# Patient Record
Sex: FEMALE | Race: WHITE | Employment: FULL TIME | URBAN - METROPOLITAN AREA
[De-identification: names, ages, dates, MRNs, and addresses within clinical notes are randomized per-mention and may not be internally consistent; named-entity substitution may affect disease eponyms.]

---

## 2021-04-04 ENCOUNTER — HOSPITAL ENCOUNTER (INPATIENT)
Age: 41
LOS: 3 days | Discharge: HOME HEALTH | DRG: 177 | End: 2021-04-08
Attending: FAMILY MEDICINE | Admitting: HOSPITALIST
Payer: COMMERCIAL

## 2021-04-04 ENCOUNTER — APPOINTMENT (OUTPATIENT)
Dept: CT IMAGING | Age: 41
DRG: 177 | End: 2021-04-04
Attending: FAMILY MEDICINE
Payer: COMMERCIAL

## 2021-04-04 ENCOUNTER — APPOINTMENT (OUTPATIENT)
Dept: GENERAL RADIOLOGY | Age: 41
DRG: 177 | End: 2021-04-04
Attending: FAMILY MEDICINE
Payer: COMMERCIAL

## 2021-04-04 DIAGNOSIS — J12.82 PNEUMONIA DUE TO COVID-19 VIRUS: ICD-10-CM

## 2021-04-04 DIAGNOSIS — U07.1 PNEUMONIA DUE TO COVID-19 VIRUS: ICD-10-CM

## 2021-04-04 DIAGNOSIS — R74.01 TRANSAMINITIS: ICD-10-CM

## 2021-04-04 DIAGNOSIS — J96.01 ACUTE RESPIRATORY FAILURE WITH HYPOXIA (HCC): Primary | ICD-10-CM

## 2021-04-04 PROBLEM — R09.02 HYPOXIA: Status: ACTIVE | Noted: 2021-04-04

## 2021-04-04 LAB
ALBUMIN SERPL-MCNC: 2.9 G/DL (ref 3.5–5)
ALBUMIN/GLOB SERPL: 0.6 {RATIO} (ref 1.1–2.2)
ALP SERPL-CCNC: 121 U/L (ref 45–117)
ALT SERPL-CCNC: 197 U/L (ref 12–78)
ANION GAP SERPL CALC-SCNC: 10 MMOL/L (ref 5–15)
APPEARANCE UR: CLEAR
AST SERPL W P-5'-P-CCNC: 151 U/L (ref 15–37)
BACTERIA URNS QL MICRO: NEGATIVE /HPF
BASOPHILS # BLD: 0 K/UL (ref 0–0.1)
BASOPHILS NFR BLD: 0 % (ref 0–1)
BILIRUB SERPL-MCNC: 1.1 MG/DL (ref 0.2–1)
BILIRUB UR QL: NEGATIVE
BUN SERPL-MCNC: 9 MG/DL (ref 6–20)
BUN/CREAT SERPL: 10 (ref 12–20)
CA-I BLD-MCNC: 8.3 MG/DL (ref 8.5–10.1)
CHLORIDE SERPL-SCNC: 96 MMOL/L (ref 97–108)
CO2 SERPL-SCNC: 28 MMOL/L (ref 21–32)
COLOR UR: YELLOW
CREAT SERPL-MCNC: 0.9 MG/DL (ref 0.55–1.02)
DIFFERENTIAL METHOD BLD: NORMAL
EOSINOPHIL # BLD: 0 K/UL (ref 0–0.4)
EOSINOPHIL NFR BLD: 0 % (ref 0–7)
EPITH CASTS URNS QL MICRO: ABNORMAL /LPF
ERYTHROCYTE [DISTWIDTH] IN BLOOD BY AUTOMATED COUNT: 13.3 % (ref 11.5–14.5)
GLOBULIN SER CALC-MCNC: 4.9 G/DL (ref 2–4)
GLUCOSE SERPL-MCNC: 111 MG/DL (ref 65–100)
GLUCOSE UR STRIP.AUTO-MCNC: NEGATIVE MG/DL
HCT VFR BLD AUTO: 41.3 % (ref 35–47)
HGB BLD-MCNC: 14 G/DL (ref 11.5–16)
HGB UR QL STRIP: ABNORMAL
IMM GRANULOCYTES # BLD AUTO: 0 K/UL (ref 0–0.04)
IMM GRANULOCYTES NFR BLD AUTO: 0 % (ref 0–0.5)
KETONES UR QL STRIP.AUTO: 15 MG/DL
LACTATE SERPL-SCNC: 1.2 MMOL/L (ref 0.4–2)
LEUKOCYTE ESTERASE UR QL STRIP.AUTO: NEGATIVE
LYMPHOCYTES # BLD: 0.9 K/UL (ref 0.8–3.5)
LYMPHOCYTES NFR BLD: 23 % (ref 12–49)
MCH RBC QN AUTO: 27.6 PG (ref 26–34)
MCHC RBC AUTO-ENTMCNC: 33.9 G/DL (ref 30–36.5)
MCV RBC AUTO: 81.5 FL (ref 80–99)
MONOCYTES # BLD: 0.3 K/UL (ref 0–1)
MONOCYTES NFR BLD: 7 % (ref 5–13)
NEUTS SEG # BLD: 2.8 K/UL (ref 1.8–8)
NEUTS SEG NFR BLD: 70 % (ref 32–75)
NITRITE UR QL STRIP.AUTO: NEGATIVE
PH UR STRIP: 6.5 [PH] (ref 5–8)
PLATELET # BLD AUTO: 270 K/UL (ref 150–400)
PMV BLD AUTO: 10 FL (ref 8.9–12.9)
POTASSIUM SERPL-SCNC: 3.4 MMOL/L (ref 3.5–5.1)
PROT SERPL-MCNC: 7.8 G/DL (ref 6.4–8.2)
PROT UR STRIP-MCNC: ABNORMAL MG/DL
RBC # BLD AUTO: 5.07 M/UL (ref 3.8–5.2)
RBC #/AREA URNS HPF: ABNORMAL /HPF (ref 0–5)
SODIUM SERPL-SCNC: 134 MMOL/L (ref 136–145)
SP GR UR REFRACTOMETRY: 1 (ref 1–1.03)
TROPONIN I SERPL-MCNC: <0.05 NG/ML
UROBILINOGEN UR QL STRIP.AUTO: 1 EU/DL (ref 0.2–1)
WBC # BLD AUTO: 4 K/UL (ref 3.6–11)
WBC URNS QL MICRO: ABNORMAL /HPF (ref 0–4)

## 2021-04-04 PROCEDURE — 71045 X-RAY EXAM CHEST 1 VIEW: CPT

## 2021-04-04 PROCEDURE — 74011000250 HC RX REV CODE- 250: Performed by: FAMILY MEDICINE

## 2021-04-04 PROCEDURE — 99284 EMERGENCY DEPT VISIT MOD MDM: CPT

## 2021-04-04 PROCEDURE — 96375 TX/PRO/DX INJ NEW DRUG ADDON: CPT

## 2021-04-04 PROCEDURE — 74011000636 HC RX REV CODE- 636: Performed by: FAMILY MEDICINE

## 2021-04-04 PROCEDURE — 71275 CT ANGIOGRAPHY CHEST: CPT

## 2021-04-04 PROCEDURE — 84484 ASSAY OF TROPONIN QUANT: CPT

## 2021-04-04 PROCEDURE — 3E0333Z INTRODUCTION OF ANTI-INFLAMMATORY INTO PERIPHERAL VEIN, PERCUTANEOUS APPROACH: ICD-10-PCS | Performed by: FAMILY MEDICINE

## 2021-04-04 PROCEDURE — 36415 COLL VENOUS BLD VENIPUNCTURE: CPT

## 2021-04-04 PROCEDURE — 74011250637 HC RX REV CODE- 250/637: Performed by: FAMILY MEDICINE

## 2021-04-04 PROCEDURE — 81001 URINALYSIS AUTO W/SCOPE: CPT

## 2021-04-04 PROCEDURE — 96374 THER/PROPH/DIAG INJ IV PUSH: CPT

## 2021-04-04 PROCEDURE — 83605 ASSAY OF LACTIC ACID: CPT

## 2021-04-04 PROCEDURE — 93005 ELECTROCARDIOGRAM TRACING: CPT

## 2021-04-04 PROCEDURE — 85025 COMPLETE CBC W/AUTO DIFF WBC: CPT

## 2021-04-04 PROCEDURE — 96361 HYDRATE IV INFUSION ADD-ON: CPT

## 2021-04-04 PROCEDURE — 80053 COMPREHEN METABOLIC PANEL: CPT

## 2021-04-04 PROCEDURE — 74011250636 HC RX REV CODE- 250/636: Performed by: FAMILY MEDICINE

## 2021-04-04 RX ORDER — DEXAMETHASONE SODIUM PHOSPHATE 10 MG/ML
6 INJECTION INTRAMUSCULAR; INTRAVENOUS ONCE
Status: COMPLETED | OUTPATIENT
Start: 2021-04-04 | End: 2021-04-04

## 2021-04-04 RX ORDER — ACETAMINOPHEN 500 MG
1000 TABLET ORAL ONCE
Status: COMPLETED | OUTPATIENT
Start: 2021-04-04 | End: 2021-04-04

## 2021-04-04 RX ADMIN — CEFTRIAXONE SODIUM 2 G: 2 INJECTION, POWDER, FOR SOLUTION INTRAMUSCULAR; INTRAVENOUS at 22:32

## 2021-04-04 RX ADMIN — IOPAMIDOL 100 ML: 755 INJECTION, SOLUTION INTRAVENOUS at 18:19

## 2021-04-04 RX ADMIN — SODIUM CHLORIDE 1000 ML: 9 INJECTION, SOLUTION INTRAVENOUS at 17:12

## 2021-04-04 RX ADMIN — ACETAMINOPHEN 1000 MG: 500 TABLET, FILM COATED ORAL at 17:35

## 2021-04-04 RX ADMIN — DEXAMETHASONE SODIUM PHOSPHATE 6 MG: 10 INJECTION, SOLUTION INTRAMUSCULAR; INTRAVENOUS at 18:34

## 2021-04-04 RX ADMIN — SODIUM CHLORIDE 1000 ML: 9 INJECTION, SOLUTION INTRAVENOUS at 16:12

## 2021-04-04 RX ADMIN — AZITHROMYCIN DIHYDRATE 500 MG: 500 INJECTION, POWDER, LYOPHILIZED, FOR SOLUTION INTRAVENOUS at 16:19

## 2021-04-04 NOTE — ED PROVIDER NOTES
EMERGENCY DEPARTMENT HISTORY AND PHYSICAL EXAM      Date: 4/4/2021  Patient Name: Kamran Black    History of Presenting Illness     Chief Complaint   Patient presents with    Fatigue    Shortness of Breath    Positive For Covid-19       History Provided By:     HPI: Kamran Black, is an extremely pleasant 46 y.o. female presenting to the ED with a chief complaint of cough, shortness of breath and fatigue. Her symptoms started 6 days ago and she was diagnosed with COVID-19 at that time. Since being diagnosed with COVID-19 she states she has become progressively more fatigued. She states she has not been eating or drinking anything. She endorses shortness of breath and a coarse cough that is getting worse. She endorses fevers, chills denies rigors. She denies nausea vomiting or diarrhea. She denies urinary symptoms. She denies any calf pain. She denies pleuritic chest pain. There are no other complaints, changes, or physical findings at this time. PCP: None    No current facility-administered medications on file prior to encounter. No current outpatient medications on file prior to encounter. Past History     Past Medical History:  No past medical history on file. Past Surgical History:  No past surgical history on file. Family History:  No family history on file. Social History:  Social History     Tobacco Use    Smoking status: Not on file   Substance Use Topics    Alcohol use: Not on file    Drug use: Not on file       Allergies:  No Known Allergies      Review of Systems     Review of Systems   Constitutional: Positive for activity change and appetite change. Negative for chills, fatigue and fever. HENT: Negative for congestion and sore throat. Eyes: Negative for photophobia and visual disturbance. Respiratory: Positive for cough and shortness of breath. Negative for wheezing. Cardiovascular: Negative for chest pain, palpitations and leg swelling.    Gastrointestinal: Negative for abdominal pain, diarrhea, nausea and vomiting. Endocrine: Negative for cold intolerance and heat intolerance. Musculoskeletal: Negative for gait problem and joint swelling. Skin: Negative for color change and rash. Neurological: Negative for dizziness and headaches. Physical Exam     Physical Exam  Constitutional:       General: She is not in acute distress. Appearance: She is well-developed. She is ill-appearing. She is not toxic-appearing. HENT:      Head: Normocephalic and atraumatic. Mouth/Throat:      Mouth: Mucous membranes are moist.      Pharynx: Oropharynx is clear. Eyes:      Conjunctiva/sclera: Conjunctivae normal.      Pupils: Pupils are equal, round, and reactive to light. Neck:      Musculoskeletal: Normal range of motion and neck supple. Cardiovascular:      Rate and Rhythm: Normal rate and regular rhythm. Heart sounds: No murmur. Pulmonary:      Effort: No respiratory distress. Breath sounds: No stridor. No wheezing or rhonchi. Comments: Occasional right-sided crackles, no increased work of breathing, no respiratory distress  Abdominal:      General: There is no distension. Tenderness: There is no abdominal tenderness. There is no rebound. Skin:     General: Skin is warm and dry. Neurological:      General: No focal deficit present. Mental Status: She is alert and oriented to person, place, and time.    Psychiatric:         Mood and Affect: Mood normal.         Behavior: Behavior normal.         Lab and Diagnostic Study Results     Labs -     Recent Results (from the past 12 hour(s))   CBC WITH AUTOMATED DIFF    Collection Time: 04/04/21  3:49 PM   Result Value Ref Range    WBC 4.0 3.6 - 11.0 K/uL    RBC 5.07 3.80 - 5.20 M/uL    HGB 14.0 11.5 - 16.0 g/dL    HCT 41.3 35.0 - 47.0 %    MCV 81.5 80.0 - 99.0 FL    MCH 27.6 26.0 - 34.0 PG    MCHC 33.9 30.0 - 36.5 g/dL    RDW 13.3 11.5 - 14.5 %    PLATELET 726 487 - 296 K/uL    MPV 10.0 8.9 - 12.9 FL    NEUTROPHILS 70 32 - 75 %    LYMPHOCYTES 23 12 - 49 %    MONOCYTES 7 5 - 13 %    EOSINOPHILS 0 0 - 7 %    BASOPHILS 0 0 - 1 %    IMMATURE GRANULOCYTES 0 0.0 - 0.5 %    ABS. NEUTROPHILS 2.8 1.8 - 8.0 K/UL    ABS. LYMPHOCYTES 0.9 0.8 - 3.5 K/UL    ABS. MONOCYTES 0.3 0.0 - 1.0 K/UL    ABS. EOSINOPHILS 0.0 0.0 - 0.4 K/UL    ABS. BASOPHILS 0.0 0.0 - 0.1 K/UL    ABS. IMM. GRANS. 0.0 0.00 - 0.04 K/UL    DF AUTOMATED     METABOLIC PANEL, COMPREHENSIVE    Collection Time: 04/04/21  3:49 PM   Result Value Ref Range    Sodium 134 (L) 136 - 145 mmol/L    Potassium 3.4 (L) 3.5 - 5.1 mmol/L    Chloride 96 (L) 97 - 108 mmol/L    CO2 28 21 - 32 mmol/L    Anion gap 10 5 - 15 mmol/L    Glucose 111 (H) 65 - 100 mg/dL    BUN 9 6 - 20 mg/dL    Creatinine 0.90 0.55 - 1.02 mg/dL    BUN/Creatinine ratio 10 (L) 12 - 20      GFR est AA >60 >60 ml/min/1.73m2    GFR est non-AA >60 >60 ml/min/1.73m2    Calcium 8.3 (L) 8.5 - 10.1 mg/dL    Bilirubin, total 1.1 (H) 0.2 - 1.0 mg/dL    AST (SGOT) 151 (H) 15 - 37 U/L    ALT (SGPT) 197 (H) 12 - 78 U/L    Alk.  phosphatase 121 (H) 45 - 117 U/L    Protein, total 7.8 6.4 - 8.2 g/dL    Albumin 2.9 (L) 3.5 - 5.0 g/dL    Globulin 4.9 (H) 2.0 - 4.0 g/dL    A-G Ratio 0.6 (L) 1.1 - 2.2     TROPONIN I    Collection Time: 04/04/21  3:49 PM   Result Value Ref Range    Troponin-I, Qt. <0.05 <0.05 ng/mL   LACTIC ACID    Collection Time: 04/04/21  3:49 PM   Result Value Ref Range    Lactic acid 1.2 0.4 - 2.0 mmol/L   URINALYSIS W/ RFLX MICROSCOPIC    Collection Time: 04/04/21  5:50 PM   Result Value Ref Range    Color Yellow      Appearance Clear Clear      Specific gravity 1.005 1.003 - 1.030      pH (UA) 6.5 5.0 - 8.0      Protein Trace (A) Negative mg/dL    Glucose Negative Negative mg/dL    Ketone 15 (A) Negative mg/dL    Bilirubin Negative Negative      Blood Large (A) Negative      Urobilinogen 1.0 0.2 - 1.0 EU/dL    Nitrites Negative Negative      Leukocyte Esterase Negative Negative URINE MICROSCOPIC    Collection Time: 04/04/21  5:50 PM   Result Value Ref Range    WBC 5-10 0 - 4 /hpf    RBC  0 - 5 /hpf    Epithelial cells Many (A) Few /lpf    Bacteria Negative Negative /hpf       Radiologic Studies -   @[email or web address]@  CT Results  (Last 48 hours)               04/04/21 1824  CTA CHEST W OR W WO CONT Final result    Impression:  Extensive patchy pneumonitis and pneumonia throughout each lung. No   evidence of PE       Narrative:  CT dose reduction was achieved through use of a standardized protocol tailored   for this examination and automatic exposure control for dose modulation. Contrast study maximum intensity projections       Patchy low-density groundglass opacification throughout the upper lobes, with   increasing density and multiple small patches of dense consolidation and   bandlike atelectasis in the lower portions of each lung. All segments affected. Central airways are open. Pulmonary arteries are moderately well opacified and show no filling defect or   distortion. Aorta shows normal dimensions, without dissection. Several   borderline-sized middle mediastinal lymph and right hilar nodes. No cardiac   finding. No pleural pericardial effusion. No significant upper abdominal or   chest wall finding               CXR Results  (Last 48 hours)               04/04/21 1605  XR CHEST PORT Final result    Narrative:  1 view       Mild patchy mixed infiltrates in the lower portions of each lung. No effusion. Normal heart and mediastinum               Medical Decision Making   - I am the first provider for this patient. - I reviewed the vital signs, available nursing notes, past medical history, past surgical history, family history and social history. - Initial assessment performed. The patients presenting problems have been discussed, and they are in agreement with the care plan formulated and outlined with them.   I have encouraged them to ask questions as they arise throughout their visit. Vital Signs-Reviewed the patient's vital signs. Patient Vitals for the past 12 hrs:   Temp Pulse Resp BP SpO2   04/04/21 1745     91 %   04/04/21 1615  96 22     04/04/21 1532 99.5 °F (37.5 °C) (!) 111 26 (!) 93/59 91 %         ED Course/ Provider Notes (Medical Decision Making):     Patient presented to the emergency department with a chief complaint of COVID-19 positive with shortness of breath, cough, fatigue and anorexia. EKG shows sinus tachycardia without ST wave abnormalities. Troponin is less than 0.05. Chest x-ray demonstrates Mild patchy mixed infiltrates in the lower portions of each lung. No effusion. Normal heart and mediastinum. She was given azithromycin and Rocephin as well as a total of 2 L of fluid. Of note she is persistently tachycardic so a CTA of her chest was ordered which which demonstrated Extensive patchy pneumonitis and pneumonia throughout each lung. No evidence of PE . Patient remained on 2 L of oxygen during her time in the emergency department for oxygen saturations that were dropping to 88% on room air. The case was ultimately discussed in detail with Dr. Veronica Hill who graciously accepted the admission. At the time of this dictation Dr. Charley Wallace will be assuming care of patient at shift change until patient is transferred. Procedures   Medical Decision Makingedical Decision Making  Performed by: Gabriela Wilson DO  Procedures  None       Disposition   Disposition:     admission      DISCHARGE PLAN:        Diagnosis     Clinical Impression:    1. Acute respiratory failure with hypoxia ([address]    2. Pneumonia due to COVID-19 virus    3. Transaminitis        Attestations:    Gabriela Wilson DO    Please note that this dictation was completed with TSB, the [employer] voice recognition software.   Quite often unanticipated grammatical, syntax, homophones, and other interpretive errors are inadvertently transcribed by the computer software. Please disregard these errors. Please excuse any errors that have escaped final proofreading. Thank you.

## 2021-04-04 NOTE — LETTER
NOTIFICATION OF RETURN TO WORK / SCHOOL 
 
4/7/2021 Ms. Stan Luna Jayden Soler [phone] [id number] [facility] [id number] To Whom It May Concern: 
 
Stan Luna was under the care of [facility] from 4/4/21 to 4/7/21. She will return to WORK on April 15, 2021. If there are questions or concerns please have the patient contact our office. Sincerely, Deion ARELLANO No name on file.

## 2021-04-05 ENCOUNTER — APPOINTMENT (OUTPATIENT)
Dept: ULTRASOUND IMAGING | Age: 41
DRG: 177 | End: 2021-04-05
Attending: FAMILY MEDICINE
Payer: COMMERCIAL

## 2021-04-05 PROBLEM — U07.1 COVID-19: Status: ACTIVE | Noted: 2021-04-05

## 2021-04-05 LAB
ALBUMIN SERPL-MCNC: 2.7 G/DL (ref 3.5–5)
ALBUMIN/GLOB SERPL: 0.5 {RATIO} (ref 1.1–2.2)
ALP SERPL-CCNC: 118 U/L (ref 45–117)
ALT SERPL-CCNC: 172 U/L (ref 12–78)
ANION GAP SERPL CALC-SCNC: 8 MMOL/L (ref 5–15)
AST SERPL W P-5'-P-CCNC: 112 U/L (ref 15–37)
BILIRUB SERPL-MCNC: 0.8 MG/DL (ref 0.2–1)
BUN SERPL-MCNC: 6 MG/DL (ref 6–20)
BUN/CREAT SERPL: 9 (ref 12–20)
CA-I BLD-MCNC: 8.1 MG/DL (ref 8.5–10.1)
CHLORIDE SERPL-SCNC: 107 MMOL/L (ref 97–108)
CO2 SERPL-SCNC: 25 MMOL/L (ref 21–32)
CREAT SERPL-MCNC: 0.68 MG/DL (ref 0.55–1.02)
CRP SERPL-MCNC: 6 MG/DL (ref 0–0.6)
ERYTHROCYTE [DISTWIDTH] IN BLOOD BY AUTOMATED COUNT: 14.3 % (ref 11.5–14.5)
FERRITIN SERPL-MCNC: 130 NG/ML (ref 8–252)
GLOBULIN SER CALC-MCNC: 5.1 G/DL (ref 2–4)
GLUCOSE SERPL-MCNC: 195 MG/DL (ref 65–100)
HCG UR QL: NEGATIVE
HCT VFR BLD AUTO: 40.6 % (ref 35–47)
HGB BLD-MCNC: 13.5 G/DL (ref 11.5–16)
LDH SERPL L TO P-CCNC: 378 U/L (ref 81–246)
MAGNESIUM SERPL-MCNC: 2.4 MG/DL (ref 1.6–2.4)
MCH RBC QN AUTO: 28 PG (ref 26–34)
MCHC RBC AUTO-ENTMCNC: 33.3 G/DL (ref 30–36.5)
MCV RBC AUTO: 84.1 FL (ref 80–99)
PLATELET # BLD AUTO: 268 K/UL (ref 150–400)
PMV BLD AUTO: 10.3 FL (ref 8.9–12.9)
POTASSIUM SERPL-SCNC: 3.2 MMOL/L (ref 3.5–5.1)
PROCALCITONIN SERPL-MCNC: 0.05 NG/ML
PROT SERPL-MCNC: 7.8 G/DL (ref 6.4–8.2)
RBC # BLD AUTO: 4.83 M/UL (ref 3.8–5.2)
SODIUM SERPL-SCNC: 140 MMOL/L (ref 136–145)
WBC # BLD AUTO: 1.6 K/UL (ref 3.6–11)

## 2021-04-05 PROCEDURE — 80053 COMPREHEN METABOLIC PANEL: CPT

## 2021-04-05 PROCEDURE — 81025 URINE PREGNANCY TEST: CPT

## 2021-04-05 PROCEDURE — 74011250637 HC RX REV CODE- 250/637: Performed by: FAMILY MEDICINE

## 2021-04-05 PROCEDURE — 76705 ECHO EXAM OF ABDOMEN: CPT

## 2021-04-05 PROCEDURE — 84145 PROCALCITONIN (PCT): CPT

## 2021-04-05 PROCEDURE — 83615 LACTATE (LD) (LDH) ENZYME: CPT

## 2021-04-05 PROCEDURE — 74011250636 HC RX REV CODE- 250/636: Performed by: FAMILY MEDICINE

## 2021-04-05 PROCEDURE — XW033E5 INTRODUCTION OF REMDESIVIR ANTI-INFECTIVE INTO PERIPHERAL VEIN, PERCUTANEOUS APPROACH, NEW TECHNOLOGY GROUP 5: ICD-10-PCS | Performed by: FAMILY MEDICINE

## 2021-04-05 PROCEDURE — 86140 C-REACTIVE PROTEIN: CPT

## 2021-04-05 PROCEDURE — 74011250636 HC RX REV CODE- 250/636: Performed by: NURSE PRACTITIONER

## 2021-04-05 PROCEDURE — 74011000258 HC RX REV CODE- 258: Performed by: FAMILY MEDICINE

## 2021-04-05 PROCEDURE — 74011000250 HC RX REV CODE- 250: Performed by: FAMILY MEDICINE

## 2021-04-05 PROCEDURE — 87040 BLOOD CULTURE FOR BACTERIA: CPT

## 2021-04-05 PROCEDURE — 65270000029 HC RM PRIVATE

## 2021-04-05 PROCEDURE — 74011250637 HC RX REV CODE- 250/637: Performed by: NURSE PRACTITIONER

## 2021-04-05 PROCEDURE — 87070 CULTURE OTHR SPECIMN AEROBIC: CPT

## 2021-04-05 PROCEDURE — 85027 COMPLETE CBC AUTOMATED: CPT

## 2021-04-05 PROCEDURE — 82728 ASSAY OF FERRITIN: CPT

## 2021-04-05 PROCEDURE — 36415 COLL VENOUS BLD VENIPUNCTURE: CPT

## 2021-04-05 PROCEDURE — 83735 ASSAY OF MAGNESIUM: CPT

## 2021-04-05 RX ORDER — ALBUTEROL SULFATE 90 UG/1
2 AEROSOL, METERED RESPIRATORY (INHALATION)
Status: DISCONTINUED | OUTPATIENT
Start: 2021-04-05 | End: 2021-04-08 | Stop reason: HOSPADM

## 2021-04-05 RX ORDER — ONDANSETRON 2 MG/ML
4 INJECTION INTRAMUSCULAR; INTRAVENOUS
Status: DISCONTINUED | OUTPATIENT
Start: 2021-04-05 | End: 2021-04-08 | Stop reason: HOSPADM

## 2021-04-05 RX ORDER — DEXAMETHASONE SODIUM PHOSPHATE 4 MG/ML
6 INJECTION, SOLUTION INTRA-ARTICULAR; INTRALESIONAL; INTRAMUSCULAR; INTRAVENOUS; SOFT TISSUE ONCE
Status: COMPLETED | OUTPATIENT
Start: 2021-04-05 | End: 2021-04-05

## 2021-04-05 RX ORDER — DEXTROMETHORPHAN POLISTIREX 30 MG/5ML
30 SUSPENSION ORAL EVERY 12 HOURS
Status: DISCONTINUED | OUTPATIENT
Start: 2021-04-05 | End: 2021-04-08 | Stop reason: HOSPADM

## 2021-04-05 RX ORDER — ENOXAPARIN SODIUM 100 MG/ML
40 INJECTION SUBCUTANEOUS DAILY
Status: DISCONTINUED | OUTPATIENT
Start: 2021-04-05 | End: 2021-04-08 | Stop reason: HOSPADM

## 2021-04-05 RX ORDER — DEXAMETHASONE SODIUM PHOSPHATE 4 MG/ML
6 INJECTION, SOLUTION INTRA-ARTICULAR; INTRALESIONAL; INTRAMUSCULAR; INTRAVENOUS; SOFT TISSUE EVERY 8 HOURS
Status: DISCONTINUED | OUTPATIENT
Start: 2021-04-05 | End: 2021-04-07

## 2021-04-05 RX ORDER — PROMETHAZINE HYDROCHLORIDE 25 MG/1
12.5 TABLET ORAL
Status: DISCONTINUED | OUTPATIENT
Start: 2021-04-05 | End: 2021-04-08 | Stop reason: HOSPADM

## 2021-04-05 RX ORDER — SODIUM CHLORIDE 0.9 % (FLUSH) 0.9 %
5-40 SYRINGE (ML) INJECTION EVERY 8 HOURS
Status: DISCONTINUED | OUTPATIENT
Start: 2021-04-05 | End: 2021-04-08 | Stop reason: HOSPADM

## 2021-04-05 RX ORDER — POTASSIUM CHLORIDE 750 MG/1
10 TABLET, FILM COATED, EXTENDED RELEASE ORAL ONCE
Status: COMPLETED | OUTPATIENT
Start: 2021-04-05 | End: 2021-04-05

## 2021-04-05 RX ORDER — GUAIFENESIN 600 MG/1
600 TABLET, EXTENDED RELEASE ORAL EVERY 12 HOURS
Status: DISCONTINUED | OUTPATIENT
Start: 2021-04-05 | End: 2021-04-08 | Stop reason: HOSPADM

## 2021-04-05 RX ORDER — ETHYNODIOL DIACETATE AND ETHINYL ESTRADIOL 1 MG-35MCG
1 KIT ORAL DAILY
COMMUNITY

## 2021-04-05 RX ORDER — ACETAMINOPHEN 325 MG/1
650 TABLET ORAL
Status: DISCONTINUED | OUTPATIENT
Start: 2021-04-05 | End: 2021-04-08 | Stop reason: HOSPADM

## 2021-04-05 RX ORDER — SODIUM CHLORIDE 0.9 % (FLUSH) 0.9 %
5-40 SYRINGE (ML) INJECTION AS NEEDED
Status: DISCONTINUED | OUTPATIENT
Start: 2021-04-05 | End: 2021-04-08 | Stop reason: HOSPADM

## 2021-04-05 RX ORDER — ACETAMINOPHEN 650 MG/1
650 SUPPOSITORY RECTAL
Status: DISCONTINUED | OUTPATIENT
Start: 2021-04-05 | End: 2021-04-08 | Stop reason: HOSPADM

## 2021-04-05 RX ORDER — POLYETHYLENE GLYCOL 3350 17 G/17G
17 POWDER, FOR SOLUTION ORAL DAILY PRN
Status: DISCONTINUED | OUTPATIENT
Start: 2021-04-05 | End: 2021-04-08 | Stop reason: HOSPADM

## 2021-04-05 RX ADMIN — GUAIFENESIN 600 MG: 600 TABLET, EXTENDED RELEASE ORAL at 14:30

## 2021-04-05 RX ADMIN — DEXAMETHASONE SODIUM PHOSPHATE 6 MG: 4 INJECTION, SOLUTION INTRAMUSCULAR; INTRAVENOUS at 04:39

## 2021-04-05 RX ADMIN — Medication 10 ML: at 22:44

## 2021-04-05 RX ADMIN — AZITHROMYCIN DIHYDRATE 500 MG: 500 INJECTION, POWDER, LYOPHILIZED, FOR SOLUTION INTRAVENOUS at 16:20

## 2021-04-05 RX ADMIN — GUAIFENESIN 600 MG: 600 TABLET, EXTENDED RELEASE ORAL at 22:15

## 2021-04-05 RX ADMIN — Medication 10 ML: at 14:32

## 2021-04-05 RX ADMIN — POTASSIUM CHLORIDE 10 MEQ: 750 TABLET, FILM COATED, EXTENDED RELEASE ORAL at 04:39

## 2021-04-05 RX ADMIN — DEXTROMETHORPHAN 30 MG: 30 SUSPENSION, EXTENDED RELEASE ORAL at 22:15

## 2021-04-05 RX ADMIN — REMDESIVIR 200 MG: 100 INJECTION, POWDER, LYOPHILIZED, FOR SOLUTION INTRAVENOUS at 04:39

## 2021-04-05 RX ADMIN — Medication 5 ML: at 02:00

## 2021-04-05 RX ADMIN — ENOXAPARIN SODIUM 40 MG: 40 INJECTION SUBCUTANEOUS at 08:56

## 2021-04-05 RX ADMIN — DEXAMETHASONE SODIUM PHOSPHATE 6 MG: 4 INJECTION, SOLUTION INTRAMUSCULAR; INTRAVENOUS at 22:15

## 2021-04-05 RX ADMIN — Medication 10 ML: at 04:40

## 2021-04-05 RX ADMIN — DEXAMETHASONE SODIUM PHOSPHATE 6 MG: 4 INJECTION, SOLUTION INTRAMUSCULAR; INTRAVENOUS at 14:30

## 2021-04-05 RX ADMIN — DEXTROMETHORPHAN 30 MG: 30 SUSPENSION, EXTENDED RELEASE ORAL at 14:30

## 2021-04-05 NOTE — H&P
History and Physical    Patient: Mary Gallegos MRN: [id number]  SSN: [id number]    YOB: 1980  Age: 46 y.o. Sex: female      Subjective:      Chief Complaint: Shortness of breath, cough    HPI: Mary Gallegos is a 46 y.o. female with no previous diagnosed medical problems presenting to the ER with complaints of decreased appetite, nausea, vomiting, diarrhea, fever, chills, shortness of breath, cough, fatigue, weakness and myalgias. Symptoms have been present since this past Monday. Ms. Marjean Cogan was tested for COVID-19 and found to be positive this past Monday. Over the past 24 hours, shortness of breath and cough have worsened. Patient presents to the ER for further treatment and evaluation. On arrival to the ER, temperature is 99.5 °F, pulse 111, respirations 26, blood pressure 93/59 and oxygen saturation 91% on room air. CT of the chest reports extensive patchy pneumonitis and pneumonia throughout each lung. Total bilirubin and liver enzymes were elevated. Potassium was decreased with a value of 3.4. Blood cultures were ordered obtained prior to administering 1 dose of IV azithromycin and ceftriaxone. IV Decadron was administered in the ER. Hospital service has been asked to admit for further treatment and evaluation. Ms. Marjean Cogan denies previous diagnosed medical problems. She has never had any surgery. Family history and social history were reviewed at the time of admission. Ms. Marjean Cogan takes oral contraceptive pills. Patient denies tobacco, alcohol or illicit drug use. Ms. Marjean Cogan is a full code. History reviewed. No pertinent past medical history. History reviewed. No pertinent surgical history.    Family History   Problem Relation Age of Onset    No Known Problems Mother     No Known Problems Father      Social History     Tobacco Use    Smoking status: Never Smoker    Smokeless tobacco: Never Used   Substance Use Topics    Alcohol use: Not on file      Prior to Admission medications    Medication Sig Start Date End Date Taking? Authorizing Provider   ethynodiol-ethinyl estradiol FIELD [facility]) 1-35 mg-mcg tab Take 1 Tab by mouth daily. Indications: birth control   Yes Provider, Historical        No Known Allergies    Review of Systems:  Constitutional: Positive for generalized weakness, fatigue, fever, chills, myalgias. Denies unexplained weight loss, night sweats. Head, Eyes, Ears, Nose, Mouth, Throat: Denies nasal congestion, sore throat, rhinorrhea, earache, ringing of the ears, difficulty hearing, facial pain, facial swelling. Respiratory: Positive for shortness of breath, cough. Denies wheezing, sputum production, hemoptysis. Denies use of oxygen at home. Cardiovascular: Denies chest pain, irregular heart beat, racing pulse, lower extremity edema, dizziness, dyspnea on exertion, orthopnea. Gastrointestinal: Positive for nausea, vomiting, diarrhea, decreased appetite. Denies constipation, abdominal pain, acid reflux, melena, hematochezia, change in bowel habits. Endocrine: Denies intolerance to heat or cold. Denies polyuria, polydipsia, polyphagia. Denies recent weight changes. Genitourinary: Denies increased urinary frequency, dysuria, hematuria, urinary incontinence, increased urinary frequency. Integument/Breast: Denies rash, itching or new skin lesions. Musculoskeletal: Denies joint swelling, joint pain, myalgias, neck pain, back pain. Neurological: Denies headaches, dizziness, confusion, tremors, numbness/tingling, paresthesias, weakness, problems with balance, loss of consciousness. Hematologic: Denies easy bleeding, easy bruising, lymphadenopathy. Behavioral/Psychiatric: Denies anxiety, depression, increased irritability, mood swings, delusions, hallucination, SI/HI.       Objective:     Vitals:    04/04/21 1745 04/04/21 1900 04/04/21 2114 04/05/21 0208   BP:  104/65 114/75 (!) 100/59   Pulse:  92 96 90   Resp:  20 18 20   Temp:    97.5 °F (36.4 °C)   SpO2: 91% 94% 92% 95%   Weight:    86.1 kg (189 lb 12.8 oz)   Height:    5' 2\" (1.575 m)        Physical Exam:  General: Alert and Oriented x 3. Appears unwell but not toxic. Cooperative. No acute distress. Nourished and well developed. Head/Eyes: Normocephalic, atraumatic, EOMI, PERRLA. Wears glasses. Nose/Mouth: Turbinates within normal limits, No drainage. Mucous membranes are moist.  Throat and Neck: Posterior pharynx without erythema or exudate. No masses, JVD, thyromegaly or lymphadenopathy appreciated. Cervical spine has good range of motion without pain. Lungs: Crackles in bilateral bases with decreased breath sounds. No appreciated rhonchi or wheezing. Symmetric chest rise with respirations. Heart: Regular rate and rhythm. Normal S1/S2. No appreciated murmurs, rubs or gallops. No lower extremity edema. Abdomen: Soft, non-tender, non-distended. Bowel sounds present in all four quadrants. No masses appreciated. Extremities:  Atraumatic. Able to move all extremities symmetrically. No abnormal bony protuberances appreciated. Back: No pain with palpation over spinous processes or paraspinal musculature. No CVA tenderness. Skin: Clean, dry and intact without appreciated lesions. Neurologic: A&Ox3. Cranial nerves 2-12 are grossly intact. Intact sensation and motor strength in all 4 extremities. No focal deficits. Psychiatric: Normal affect, normal thought process, good eye contact.      Recent Results (from the past 24 hour(s))   CBC WITH AUTOMATED DIFF    Collection Time: 04/04/21  3:49 PM   Result Value Ref Range    WBC 4.0 3.6 - 11.0 K/uL    RBC 5.07 3.80 - 5.20 M/uL    HGB 14.0 11.5 - 16.0 g/dL    HCT 41.3 35.0 - 47.0 %    MCV 81.5 80.0 - 99.0 FL    MCH 27.6 26.0 - 34.0 PG    MCHC 33.9 30.0 - 36.5 g/dL    RDW 13.3 11.5 - 14.5 %    PLATELET 782 319 - 129 K/uL    MPV 10.0 8.9 - 12.9 FL    NEUTROPHILS 70 32 - 75 %    LYMPHOCYTES 23 12 - 49 %    MONOCYTES 7 5 - 13 %    EOSINOPHILS 0 0 - 7 %    BASOPHILS 0 0 - 1 %    IMMATURE GRANULOCYTES 0 0.0 - 0.5 %    ABS. NEUTROPHILS 2.8 1.8 - 8.0 K/UL    ABS. LYMPHOCYTES 0.9 0.8 - 3.5 K/UL    ABS. MONOCYTES 0.3 0.0 - 1.0 K/UL    ABS. EOSINOPHILS 0.0 0.0 - 0.4 K/UL    ABS. BASOPHILS 0.0 0.0 - 0.1 K/UL    ABS. IMM. GRANS. 0.0 0.00 - 0.04 K/UL    DF AUTOMATED     METABOLIC PANEL, COMPREHENSIVE    Collection Time: 04/04/21  3:49 PM   Result Value Ref Range    Sodium 134 (L) 136 - 145 mmol/L    Potassium 3.4 (L) 3.5 - 5.1 mmol/L    Chloride 96 (L) 97 - 108 mmol/L    CO2 28 21 - 32 mmol/L    Anion gap 10 5 - 15 mmol/L    Glucose 111 (H) 65 - 100 mg/dL    BUN 9 6 - 20 mg/dL    Creatinine 0.90 0.55 - 1.02 mg/dL    BUN/Creatinine ratio 10 (L) 12 - 20      GFR est AA >60 >60 ml/min/1.73m2    GFR est non-AA >60 >60 ml/min/1.73m2    Calcium 8.3 (L) 8.5 - 10.1 mg/dL    Bilirubin, total 1.1 (H) 0.2 - 1.0 mg/dL    AST (SGOT) 151 (H) 15 - 37 U/L    ALT (SGPT) 197 (H) 12 - 78 U/L    Alk.  phosphatase 121 (H) 45 - 117 U/L    Protein, total 7.8 6.4 - 8.2 g/dL    Albumin 2.9 (L) 3.5 - 5.0 g/dL    Globulin 4.9 (H) 2.0 - 4.0 g/dL    A-G Ratio 0.6 (L) 1.1 - 2.2     TROPONIN I    Collection Time: 04/04/21  3:49 PM   Result Value Ref Range    Troponin-I, Qt. <0.05 <0.05 ng/mL   LACTIC ACID    Collection Time: 04/04/21  3:49 PM   Result Value Ref Range    Lactic acid 1.2 0.4 - 2.0 mmol/L   URINALYSIS W/ RFLX MICROSCOPIC    Collection Time: 04/04/21  5:50 PM   Result Value Ref Range    Color Yellow      Appearance Clear Clear      Specific gravity 1.005 1.003 - 1.030      pH (UA) 6.5 5.0 - 8.0      Protein Trace (A) Negative mg/dL    Glucose Negative Negative mg/dL    Ketone 15 (A) Negative mg/dL    Bilirubin Negative Negative      Blood Large (A) Negative      Urobilinogen 1.0 0.2 - 1.0 EU/dL    Nitrites Negative Negative      Leukocyte Esterase Negative Negative     URINE MICROSCOPIC    Collection Time: 04/04/21  5:50 PM   Result Value Ref Range    WBC 5-10 0 - 4 /hpf    RBC  0 - 5 /hpf    Epithelial cells Many (A) Few /lpf    Bacteria Negative Negative /hpf       XR Results (maximum last 3): Results from Hospital Encounter encounter on 04/04/21   XR CHEST PORT    Narrative 1 view    Mild patchy mixed infiltrates in the lower portions of each lung. No effusion. Normal heart and mediastinum       Assessment:     Juancarlos Leger is a 46 y.o. female who presents with multiple symptoms including fever, chills, shortness of breath and cough. Ms. Sabino Diaz was found to be COVID-19 positive this past Monday. Admit for acute hypoxic respiratory failure secondary to COVID-19 and pneumonia. Plan:     1. Admit to remote telemetry bed. 2. Covid-19 results are positive. Continue isolation precautions. Monitor blood cultures. Order sputum culture. Continue IV azithromycin. Order IV Decadron. Check ferritin, LDH, CRP and procalcitonin levels. 3. Provide oxygen via nasal cannula PRN oxygen saturations <93%. Order Q4H Albuterol SOB/Wheezing. 4. Check urine hcg.  5. For hypokalemia, order PO potassium supplement. Continue to monitor electrolytes during hospitalization and replenish as needed. 6. Check ultrasound of the liver and gallbladder for hyperbilirubinemia and transaminitis. GI PPX: Diet ordered. DVT PPX: Lovenox SQ.     Signed By: Pradeep Lundberg MD     April 5, 2021

## 2021-04-05 NOTE — ACP (ADVANCE CARE PLANNING)
Advance Care Planning     Advance Care Planning (ACP) Physician/NP/PA Conversation      Date of Conversation: 4/4/2021  Conducted with: Patient with [address] Decision Maker:     Click here to complete [employer] including selection of the [employer] Relationship (ie \"Primary\")  Today we documented Decision Maker(s) consistent with Legal Next of Kin hierarchy. Care Preferences:    Hospitalization: \"If your health worsens and it becomes clear that your chance of recovery is unlikely, what would be your preference regarding hospitalization? \"  The patient would prefer hospitalization. Ventilation: \"If you were unable to breathe on your own and your chance of recovery was unlikely, what would be your preference about the use of a ventilator (breathing machine) if it was available to you? \"   The patient would desire the use of a ventilator. Resuscitation: \"In the event your heart stopped as a result of an underlying serious health condition, would you want attempts to be made to restart your heart, or would you prefer a natural death? \"   Yes, attempt to resuscitate.     Additional topics discussed: resuscitation preferences    Conversation Outcomes / Follow-Up Plan:   ACP complete - no further action today  Reviewed DNR/DNI and patient elects Full Code (Attempt Resuscitation)     Length of Voluntary ACP Conversation in minutes:  <16 minutes (Non-Billable)    Jean Maya MD

## 2021-04-05 NOTE — PROGRESS NOTES
Hospitalist Progress Note    Subjective:   Daily Progress Note: 4/5/2021 4:59 PM    Hospital Course:  Pt admitted for cough, shortness of breath and fatigue, tested positive for covid 19 last week. She denies PMH. Subjective: Pt seen in room, on supplemental oxygen, has nonproductive cough. Current Facility-Administered Medications   Medication Dose Route Frequency    albuterol (PROVENTIL HFA, VENTOLIN HFA, PROAIR HFA) inhaler 2 Puff  2 Puff Inhalation Q4H PRN    sodium chloride (NS) flush 5-40 mL  5-40 mL IntraVENous Q8H    sodium chloride (NS) flush 5-40 mL  5-40 mL IntraVENous PRN    acetaminophen (TYLENOL) tablet 650 mg  650 mg Oral Q6H PRN    Or    acetaminophen (TYLENOL) suppository 650 mg  650 mg Rectal Q6H PRN    polyethylene glycol (MIRALAX) packet 17 g  17 g Oral DAILY PRN    promethazine (PHENERGAN) tablet 12.5 mg  12.5 mg Oral Q6H PRN    Or    ondansetron (ZOFRAN) injection 4 mg  4 mg IntraVENous Q6H PRN    enoxaparin (LOVENOX) injection 40 mg  40 mg SubCUTAneous DAILY    azithromycin (ZITHROMAX) 500 mg in 0.9% sodium chloride 250 mL (VIAL-MATE)  500 mg IntraVENous Q24H    [START ON 4/6/2021] remdesivir 100 mg in 0.9% sodium chloride 250 mL IVPB  100 mg IntraVENous Q24H    guaiFENesin ER (MUCINEX) tablet 600 mg  600 mg Oral Q12H    dexamethasone (DECADRON) 4 mg/mL injection 6 mg  6 mg IntraVENous Q8H    dextromethorphan (DELSYM) 30 mg/5 mL syrup 30 mg  30 mg Oral Q12H        Review of Systems:    Review of Systems   Constitutional: Positive for fever and malaise/fatigue. HENT: Negative for congestion and sore throat. Respiratory: Positive for cough and shortness of breath. Cardiovascular: Negative for chest pain and palpitations. Gastrointestinal: Negative for abdominal pain, heartburn, nausea and vomiting. Genitourinary: Negative for dysuria and urgency. Neurological: Negative for dizziness and headaches.         Objective:     Visit Vitals  /70   Pulse 87   Temp 97.7 °F (36.5 °C)   Resp 28   Ht 5' 2\" (1.575 m)   Wt 86.1 kg (189 lb 12.8 oz)   SpO2 93%   BMI 34.71 kg/m²    O2 Flow Rate (L/min): 2 l/min O2 Device: Nasal cannula    Temp (24hrs), Av.6 °F (36.4 °C), Min:97.5 °F (36.4 °C), Max:97.7 °F (36.5 °C)      No intake/output data recorded.  190 -  0700  In:  [I.V.:]  Out: -     PHYSICAL EXAM:    Physical Exam   Constitutional: She is oriented to person, place, and time. She appears well-developed. No distress. Cardiovascular: Normal rate, regular rhythm, normal heart sounds and intact distal pulses. Pulmonary/Chest: Effort normal. No respiratory distress. She has decreased breath sounds in the right lower field and the left lower field. Abdominal: Soft. Musculoskeletal: Normal range of motion. Neurological: She is alert and oriented to person, place, and time. Skin: Skin is warm and dry. Psychiatric: She has a normal mood and affect.  Her behavior is normal.          Data Review    Recent Results (from the past 24 hour(s))   URINALYSIS W/ RFLX MICROSCOPIC    Collection Time: 21  5:50 PM   Result Value Ref Range    Color Yellow      Appearance Clear Clear      Specific gravity 1.005 1.003 - 1.030      pH (UA) 6.5 5.0 - 8.0      Protein Trace (A) Negative mg/dL    Glucose Negative Negative mg/dL    Ketone 15 (A) Negative mg/dL    Bilirubin Negative Negative      Blood Large (A) Negative      Urobilinogen 1.0 0.2 - 1.0 EU/dL    Nitrites Negative Negative      Leukocyte Esterase Negative Negative     URINE MICROSCOPIC    Collection Time: 21  5:50 PM   Result Value Ref Range    WBC 5-10 0 - 4 /hpf    RBC  0 - 5 /hpf    Epithelial cells Many (A) Few /lpf    Bacteria Negative Negative /hpf   C REACTIVE PROTEIN, QT    Collection Time: 21  2:54 AM   Result Value Ref Range    C-Reactive protein 6.00 (H) 0.00 - 0.60 mg/dL   LD    Collection Time: 21  2:54 AM   Result Value Ref Range     (H) 81 - 246 U/L PROCALCITONIN    Collection Time: 04/05/21  2:54 AM   Result Value Ref Range    Procalcitonin 0.05 (H) 0 ng/mL   METABOLIC PANEL, COMPREHENSIVE    Collection Time: 04/05/21  2:54 AM   Result Value Ref Range    Sodium 140 136 - 145 mmol/L    Potassium 3.2 (L) 3.5 - 5.1 mmol/L    Chloride 107 97 - 108 mmol/L    CO2 25 21 - 32 mmol/L    Anion gap 8 5 - 15 mmol/L    Glucose 195 (H) 65 - 100 mg/dL    BUN 6 6 - 20 mg/dL    Creatinine 0.68 0.55 - 1.02 mg/dL    BUN/Creatinine ratio 9 (L) 12 - 20      GFR est AA >60 >60 ml/min/1.73m2    GFR est non-AA >60 >60 ml/min/1.73m2    Calcium 8.1 (L) 8.5 - 10.1 mg/dL    Bilirubin, total 0.8 0.2 - 1.0 mg/dL    AST (SGOT) 112 (H) 15 - 37 U/L    ALT (SGPT) 172 (H) 12 - 78 U/L    Alk. phosphatase 118 (H) 45 - 117 U/L    Protein, total 7.8 6.4 - 8.2 g/dL    Albumin 2.7 (L) 3.5 - 5.0 g/dL    Globulin 5.1 (H) 2.0 - 4.0 g/dL    A-G Ratio 0.5 (L) 1.1 - 2.2     MAGNESIUM    Collection Time: 04/05/21  2:54 AM   Result Value Ref Range    Magnesium 2.4 1.6 - 2.4 mg/dL   CBC W/O DIFF    Collection Time: 04/05/21  2:54 AM   Result Value Ref Range    WBC 1.6 (L) 3.6 - 11.0 K/uL    RBC 4.83 3.80 - 5.20 M/uL    HGB 13.5 11.5 - 16.0 g/dL    HCT 40.6 35.0 - 47.0 %    MCV 84.1 80.0 - 99.0 FL    MCH 28.0 26.0 - 34.0 PG    MCHC 33.3 30.0 - 36.5 g/dL    RDW 14.3 11.5 - 14.5 %    PLATELET 156 827 - 536 K/uL    MPV 10.3 8.9 - 12.9 FL   HCG URINE, QL    Collection Time: 04/05/21  3:40 AM   Result Value Ref Range    HCG urine, QL Negative Negative         US ABD LTD   Final Result   No gallstones or acute process definitively identified. CTA CHEST W OR W WO CONT   Final Result   Extensive patchy pneumonitis and pneumonia throughout each lung. No   evidence of PE      XR CHEST PORT   Final Result          Active Problems:    Hypoxia (4/4/2021)      Pneumonia due to COVID-19 virus (4/4/2021)      COVID-19 (4/5/2021)        Assessment/Plan:   1.  Covid 19 pneumonitis- hypoxia, wean O2 as tolerated,  On IV zithromax, remdesivir. IV decadron, BC pending. 2. Transaminitis- US of abdomen is normal ,  No gallstones,  ? R/t to viral infection, will monitor enzymes. 3. Hypokalemia- secondary to nausea/vomiting/diarrhea- received KCL x1 dose  Recheck bmp in am    4.  Leukopenia- secondary to viral infection, repeat CBC in am.            DVT Prophylaxis: lovenox  Code Status:  Full Code  POA:    Care Plan discussed with:   _____patient,staff nurse__________________________________________________________    Jose Miguel Hagen NP

## 2021-04-05 NOTE — ROUTINE PROCESS
Bedside shift change report given to Patricio Singleton RN (oncoming nurse) by Gabbi Hand RN (offgoing nurse). Report included the following information SBAR, Kardex, and MAR.

## 2021-04-05 NOTE — PROGRESS NOTES
Reason for Admission:   Hypoxia PNA d/t COVID 19                    RUR Score:     10%              PCP: First and Last name:   None     Name of Practice:    Are you a current patient: Yes/No:    Approximate date of last visit:    Can you participate in a virtual visit if needed:     Do you (patient/family) have any concerns for transition/discharge? Patient voiced concern re: her insurance being out of network. Plan for utilizing home health:   No prior HH. Current Advanced Directive/Advance Care Plan:  Full Code      Healthcare Decision Maker:   Shari Claude (daughter/25years old). Click here to complete [address] including selection of the Healthcare Decision Maker Relationship (ie \"Primary\")              Transition of Care Plan:            Lives in her apartment w/two children. The oldest is 25. No established PCP. [address]). Independent w/all ADL's, IADL's, and drives. No home O2 or DME. No prior HH, SNF, and or IRF. Informed her insurance is out of network. Patient vented her frustrations and asked \"why didn't patient registration tell me this? \"  SW informed unable to explain why as the person who worked registration didn't inform . Patient to speak w/attending about this. Care Management Interventions  PCP Verified by CM: Yes  Mode of Transport at Discharge:  Other (see comment)  Transition of Care Consult (CM Consult): Discharge Planning  Discharge Durable Medical Equipment: (No home O2 or DME)  Current Support Network: Own Home(Apartment on 3rd floor)  Confirm Follow Up Transport: Family  Discharge Location  Discharge Placement: [address]

## 2021-04-05 NOTE — PROGRESS NOTES
Primary Nurse Jo-Ann Beckwith RN and Alexander Harvey RN performed a dual skin assessment on this patient, no impairment noted.

## 2021-04-06 LAB
ANION GAP SERPL CALC-SCNC: 5 MMOL/L (ref 5–15)
BUN SERPL-MCNC: 11 MG/DL (ref 6–20)
BUN/CREAT SERPL: 21 (ref 12–20)
CA-I BLD-MCNC: 8.5 MG/DL (ref 8.5–10.1)
CHLORIDE SERPL-SCNC: 112 MMOL/L (ref 97–108)
CO2 SERPL-SCNC: 26 MMOL/L (ref 21–32)
CREAT SERPL-MCNC: 0.53 MG/DL (ref 0.55–1.02)
ERYTHROCYTE [DISTWIDTH] IN BLOOD BY AUTOMATED COUNT: 14.2 % (ref 11.5–14.5)
GLUCOSE SERPL-MCNC: 145 MG/DL (ref 65–100)
HCT VFR BLD AUTO: 36.8 % (ref 35–47)
HGB BLD-MCNC: 11.9 G/DL (ref 11.5–16)
MCH RBC QN AUTO: 27.1 PG (ref 26–34)
MCHC RBC AUTO-ENTMCNC: 32.3 G/DL (ref 30–36.5)
MCV RBC AUTO: 83.8 FL (ref 80–99)
PLATELET # BLD AUTO: 343 K/UL (ref 150–400)
PMV BLD AUTO: 10.7 FL (ref 8.9–12.9)
POTASSIUM SERPL-SCNC: 3.5 MMOL/L (ref 3.5–5.1)
RBC # BLD AUTO: 4.39 M/UL (ref 3.8–5.2)
SODIUM SERPL-SCNC: 143 MMOL/L (ref 136–145)
WBC # BLD AUTO: 6.5 K/UL (ref 3.6–11)

## 2021-04-06 PROCEDURE — 80048 BASIC METABOLIC PNL TOTAL CA: CPT

## 2021-04-06 PROCEDURE — 94760 N-INVAS EAR/PLS OXIMETRY 1: CPT

## 2021-04-06 PROCEDURE — 36415 COLL VENOUS BLD VENIPUNCTURE: CPT

## 2021-04-06 PROCEDURE — 74011250637 HC RX REV CODE- 250/637: Performed by: NURSE PRACTITIONER

## 2021-04-06 PROCEDURE — 77010033678 HC OXYGEN DAILY

## 2021-04-06 PROCEDURE — 74011250636 HC RX REV CODE- 250/636: Performed by: NURSE PRACTITIONER

## 2021-04-06 PROCEDURE — 74011000258 HC RX REV CODE- 258: Performed by: FAMILY MEDICINE

## 2021-04-06 PROCEDURE — 74011000250 HC RX REV CODE- 250: Performed by: FAMILY MEDICINE

## 2021-04-06 PROCEDURE — 74011250636 HC RX REV CODE- 250/636: Performed by: FAMILY MEDICINE

## 2021-04-06 PROCEDURE — 85027 COMPLETE CBC AUTOMATED: CPT

## 2021-04-06 PROCEDURE — 65270000029 HC RM PRIVATE

## 2021-04-06 PROCEDURE — 74011250637 HC RX REV CODE- 250/637: Performed by: FAMILY MEDICINE

## 2021-04-06 RX ORDER — POTASSIUM CHLORIDE 20 MEQ/1
40 TABLET, EXTENDED RELEASE ORAL
Status: COMPLETED | OUTPATIENT
Start: 2021-04-06 | End: 2021-04-06

## 2021-04-06 RX ADMIN — REMDESIVIR 100 MG: 100 INJECTION, POWDER, LYOPHILIZED, FOR SOLUTION INTRAVENOUS at 08:29

## 2021-04-06 RX ADMIN — GUAIFENESIN 600 MG: 600 TABLET, EXTENDED RELEASE ORAL at 08:28

## 2021-04-06 RX ADMIN — Medication 10 ML: at 16:09

## 2021-04-06 RX ADMIN — GUAIFENESIN 600 MG: 600 TABLET, EXTENDED RELEASE ORAL at 20:43

## 2021-04-06 RX ADMIN — DEXAMETHASONE SODIUM PHOSPHATE 6 MG: 4 INJECTION, SOLUTION INTRAMUSCULAR; INTRAVENOUS at 20:43

## 2021-04-06 RX ADMIN — DEXAMETHASONE SODIUM PHOSPHATE 6 MG: 4 INJECTION, SOLUTION INTRAMUSCULAR; INTRAVENOUS at 05:27

## 2021-04-06 RX ADMIN — DEXAMETHASONE SODIUM PHOSPHATE 6 MG: 4 INJECTION, SOLUTION INTRAMUSCULAR; INTRAVENOUS at 16:09

## 2021-04-06 RX ADMIN — Medication 10 ML: at 20:43

## 2021-04-06 RX ADMIN — Medication 10 ML: at 05:37

## 2021-04-06 RX ADMIN — POTASSIUM CHLORIDE 40 MEQ: 1500 TABLET, EXTENDED RELEASE ORAL at 14:57

## 2021-04-06 RX ADMIN — AZITHROMYCIN DIHYDRATE 500 MG: 500 INJECTION, POWDER, LYOPHILIZED, FOR SOLUTION INTRAVENOUS at 17:57

## 2021-04-06 RX ADMIN — DEXTROMETHORPHAN 30 MG: 30 SUSPENSION, EXTENDED RELEASE ORAL at 20:43

## 2021-04-06 RX ADMIN — ACETAMINOPHEN 650 MG: 325 TABLET, FILM COATED ORAL at 20:44

## 2021-04-06 RX ADMIN — DEXTROMETHORPHAN 30 MG: 30 SUSPENSION, EXTENDED RELEASE ORAL at 08:28

## 2021-04-06 NOTE — PROGRESS NOTES
Hospitalist Progress Note         STEVEN Lynch, FNP-C    Daily Progress Note: 4/6/2021      Subjective:   Subjective   Seen on follow-up alert and oriented lying in bed  Patient reports shortness of breath with ambulation  No acute distress noted on examination    Review of Systems:   Review of Systems   Constitutional: Negative. HENT: Negative. Eyes: Negative. Respiratory: Positive for cough and shortness of breath. Cardiovascular: Negative. Gastrointestinal: Negative. Genitourinary: Negative. Musculoskeletal: Negative. Skin: Negative. Neurological: Negative. Endo/Heme/Allergies: Negative. Psychiatric/Behavioral: Negative. Objective:   Objective      Vitals:  Patient Vitals for the past 12 hrs:   Temp Pulse Resp BP SpO2   04/06/21 0921     91 %   04/06/21 0724 97.5 °F (36.4 °C) 82 18 116/74 91 %   04/06/21 0115 98.6 °F (37 °C) 81 18 113/68 92 %        Physical Exam:  Physical Exam  Vitals signs and nursing note reviewed. HENT:      Head: Normocephalic. Nose: Nose normal.      Mouth/Throat:      Mouth: Mucous membranes are moist.   Eyes:      Extraocular Movements: Extraocular movements intact. Neck:      Musculoskeletal: Normal range of motion. Cardiovascular:      Rate and Rhythm: Normal rate and regular rhythm. Pulses: Normal pulses. Heart sounds: Normal heart sounds. Pulmonary:      Effort: Pulmonary effort is normal.      Comments: 2L NC, congested cough  Abdominal:      General: Bowel sounds are normal.   Musculoskeletal: Normal range of motion. Skin:     General: Skin is warm and dry. Capillary Refill: Capillary refill takes less than 2 seconds. Neurological:      Mental Status: She is alert and oriented to person, place, and time.    Psychiatric:         Mood and Affect: Mood normal.         Behavior: Behavior normal.          Lab Results:  Recent Results (from the past 24 hour(s))   CBC W/O DIFF    Collection Time: 04/06/21  6:25 AM   Result Value Ref Range    WBC 6.5 3.6 - 11.0 K/uL    RBC 4.39 3.80 - 5.20 M/uL    HGB 11.9 11.5 - 16.0 g/dL    HCT 36.8 35.0 - 47.0 %    MCV 83.8 80.0 - 99.0 FL    MCH 27.1 26.0 - 34.0 PG    MCHC 32.3 30.0 - 36.5 g/dL    RDW 14.2 11.5 - 14.5 %    PLATELET 411 474 - 394 K/uL    MPV 10.7 8.9 - 48.8 FL   METABOLIC PANEL, BASIC    Collection Time: 04/06/21  6:25 AM   Result Value Ref Range    Sodium 143 136 - 145 mmol/L    Potassium 3.5 3.5 - 5.1 mmol/L    Chloride 112 (H) 97 - 108 mmol/L    CO2 26 21 - 32 mmol/L    Anion gap 5 5 - 15 mmol/L    Glucose 145 (H) 65 - 100 mg/dL    BUN 11 6 - 20 mg/dL    Creatinine 0.53 (L) 0.55 - 1.02 mg/dL    BUN/Creatinine ratio 21 (H) 12 - 20      GFR est AA >60 >60 ml/min/1.73m2    GFR est non-AA >60 >60 ml/min/1.73m2    Calcium 8.5 8.5 - 10.1 mg/dL          Diagnostic Images:  CT Results  (Last 48 hours)               04/04/21 1824  CTA CHEST W OR W WO CONT Final result    Impression:  Extensive patchy pneumonitis and pneumonia throughout each lung. No   evidence of PE       Narrative:  CT dose reduction was achieved through use of a standardized protocol tailored   for this examination and automatic exposure control for dose modulation. Contrast study maximum intensity projections       Patchy low-density groundglass opacification throughout the upper lobes, with   increasing density and multiple small patches of dense consolidation and   bandlike atelectasis in the lower portions of each lung. All segments affected. Central airways are open. Pulmonary arteries are moderately well opacified and show no filling defect or   distortion. Aorta shows normal dimensions, without dissection. Several   borderline-sized middle mediastinal lymph and right hilar nodes. No cardiac   finding. No pleural pericardial effusion.  No significant upper abdominal or   chest wall finding                 Current Medications:    Current Facility-Administered Medications:    potassium chloride (K-DUR, KLOR-CON) SR tablet 40 mEq, 40 mEq, Oral, NOW, Steve Lev, NP    albuterol (PROVENTIL HFA, VENTOLIN HFA, PROAIR HFA) inhaler 2 Puff, 2 Puff, Inhalation, Q4H PRN, Ramin Moyer MD    sodium chloride (NS) flush 5-40 mL, 5-40 mL, IntraVENous, Q8H, Ramin Moyer MD, 10 mL at 04/06/21 0537    sodium chloride (NS) flush 5-40 mL, 5-40 mL, IntraVENous, PRN, Ramin Moyer MD    acetaminophen (TYLENOL) tablet 650 mg, 650 mg, Oral, Q6H PRN **OR** acetaminophen (TYLENOL) suppository 650 mg, 650 mg, Rectal, Q6H PRN, Ramin Moyer MD    polyethylene glycol (MIRALAX) packet 17 g, 17 g, Oral, DAILY PRN, Ramin Moyer MD    promethazine (PHENERGAN) tablet 12.5 mg, 12.5 mg, Oral, Q6H PRN **OR** ondansetron (ZOFRAN) injection 4 mg, 4 mg, IntraVENous, Q6H PRN, Ramin Moyer MD    enoxaparin (LOVENOX) injection 40 mg, 40 mg, SubCUTAneous, DAILY, Erica RUIZ MD, 40 mg at 04/05/21 0856    azithromycin (ZITHROMAX) 500 mg in 0.9% sodium chloride 250 mL (VIAL-MATE), 500 mg, IntraVENous, Q24H, Erica RUIZ MD, 500 mg at 04/05/21 1620    remdesivir 100 mg in 0.9% sodium chloride 250 mL IVPB, 100 mg, IntraVENous, Q24H, Erica RUIZ MD, 100 mg at 04/06/21 3634    guaiFENesin ER (MUCINEX) tablet 600 mg, 600 mg, Oral, Q12H, Karri Armendariz, NP, 600 mg at 04/06/21 0828    dexamethasone (DECADRON) 4 mg/mL injection 6 mg, 6 mg, IntraVENous, Q8H, Karri Armendarzi, NP, 6 mg at 04/06/21 0527    dextromethorphan (DELSYM) 30 mg/5 mL syrup 30 mg, 30 mg, Oral, Q12H, Karri Armendariz NP, 30 mg at 04/06/21 7692       ASSESSMENT:    1. Covid Pneumonia: Continue azithromycin, Decadron 6 mg every 8 hours, Tessalon, Mucinex, remdesivir. Currently on supplemental O2, wean as tolerated. As needed inhalers  2. Covid 19: Continue above treatment plan  3. Hypokalemia: Low normal, replenished with oral potassium 40meq  4.  Transaminase: Ultrasound renal negative        Full Code  Dvt Prophylaxis  GI Prophylaxis  Home medications reviewed and reconciled      Above diagnostic results and treatment plan reviewed and discussed with patient in detail at bedside, all questions answered. Anticipate discharge within 24 hours pending patient remains clinically stable. Care Plan discussed with: Interdisciplinary team    Total time spent with patient: 25 minutes.     Vicki Nogueira NP

## 2021-04-06 NOTE — PROGRESS NOTES
Problem: Falls - Risk of  Goal: *Absence of Falls  Description: Document Elizabeth Valladares Fall Risk and appropriate interventions in the flowsheet.   Outcome: Progressing Towards Goal  Note: Fall Risk Interventions:                                Problem: Patient Education: Go to Patient Education Activity  Goal: Patient/Family Education  Outcome: Progressing Towards Goal

## 2021-04-07 LAB
BACTERIA SPEC CULT: NORMAL
GRAM STN SPEC: NORMAL
SPECIAL REQUESTS,SREQ: NORMAL

## 2021-04-07 PROCEDURE — 77010033678 HC OXYGEN DAILY

## 2021-04-07 PROCEDURE — 74011250637 HC RX REV CODE- 250/637: Performed by: NURSE PRACTITIONER

## 2021-04-07 PROCEDURE — 74011000250 HC RX REV CODE- 250: Performed by: FAMILY MEDICINE

## 2021-04-07 PROCEDURE — 74011000258 HC RX REV CODE- 258: Performed by: FAMILY MEDICINE

## 2021-04-07 PROCEDURE — 74011636637 HC RX REV CODE- 636/637: Performed by: NURSE PRACTITIONER

## 2021-04-07 PROCEDURE — 74011250636 HC RX REV CODE- 250/636: Performed by: NURSE PRACTITIONER

## 2021-04-07 PROCEDURE — 65270000029 HC RM PRIVATE

## 2021-04-07 PROCEDURE — 94760 N-INVAS EAR/PLS OXIMETRY 1: CPT

## 2021-04-07 RX ORDER — ALBUTEROL SULFATE 90 UG/1
2 AEROSOL, METERED RESPIRATORY (INHALATION)
Qty: 1 INHALER | Refills: 0 | Status: SHIPPED | OUTPATIENT
Start: 2021-04-07 | End: 2021-05-07

## 2021-04-07 RX ORDER — PREDNISONE 20 MG/1
40 TABLET ORAL
Status: DISCONTINUED | OUTPATIENT
Start: 2021-04-07 | End: 2021-04-08 | Stop reason: HOSPADM

## 2021-04-07 RX ORDER — AZITHROMYCIN 500 MG/1
500 TABLET, FILM COATED ORAL DAILY
Qty: 3 TAB | Refills: 0 | Status: SHIPPED | OUTPATIENT
Start: 2021-04-07 | End: 2021-04-10

## 2021-04-07 RX ORDER — AZITHROMYCIN 500 MG/1
500 TABLET, FILM COATED ORAL DAILY
Status: DISCONTINUED | OUTPATIENT
Start: 2021-04-07 | End: 2021-04-08 | Stop reason: HOSPADM

## 2021-04-07 RX ORDER — GUAIFENESIN 600 MG/1
600 TABLET, EXTENDED RELEASE ORAL EVERY 12 HOURS
Qty: 14 TAB | Refills: 0 | Status: SHIPPED | OUTPATIENT
Start: 2021-04-07 | End: 2021-04-14

## 2021-04-07 RX ADMIN — AZITHROMYCIN MONOHYDRATE 500 MG: 500 TABLET ORAL at 14:26

## 2021-04-07 RX ADMIN — DEXTROMETHORPHAN 30 MG: 30 SUSPENSION, EXTENDED RELEASE ORAL at 21:30

## 2021-04-07 RX ADMIN — GUAIFENESIN 600 MG: 600 TABLET, EXTENDED RELEASE ORAL at 21:30

## 2021-04-07 RX ADMIN — DEXTROMETHORPHAN 30 MG: 30 SUSPENSION, EXTENDED RELEASE ORAL at 08:59

## 2021-04-07 RX ADMIN — DEXAMETHASONE SODIUM PHOSPHATE 6 MG: 4 INJECTION, SOLUTION INTRAMUSCULAR; INTRAVENOUS at 05:45

## 2021-04-07 RX ADMIN — REMDESIVIR 100 MG: 100 INJECTION, POWDER, LYOPHILIZED, FOR SOLUTION INTRAVENOUS at 08:59

## 2021-04-07 RX ADMIN — PREDNISONE 40 MG: 20 TABLET ORAL at 14:26

## 2021-04-07 RX ADMIN — GUAIFENESIN 600 MG: 600 TABLET, EXTENDED RELEASE ORAL at 08:59

## 2021-04-07 RX ADMIN — Medication 10 ML: at 05:05

## 2021-04-07 NOTE — PROGRESS NOTES
Problem: Falls - Risk of  Goal: *Absence of Falls  Description: Document Rebeccamanfred Rene Fall Risk and appropriate interventions in the flowsheet.   Outcome: Progressing Towards Goal  Note: Fall Risk Interventions:                                Problem: Patient Education: Go to Patient Education Activity  Goal: Patient/Family Education  Outcome: Progressing Towards Goal

## 2021-04-07 NOTE — PROGRESS NOTES
SpO2 on room air, at rest=90%. SpO2 on room air, while ambulating=86%. SpO2 on 2L , while ambulating=92%.

## 2021-04-07 NOTE — PROGRESS NOTES
2 person skin assessment done with Lainey Barger RN. Pt has no areas of open skin, no pressure injuries at this time.

## 2021-04-07 NOTE — PROGRESS NOTES
SW spoke w/patient re: possibly needing home O2 at time of discharge. Patient acknowledged understanding. No HH needs at time of discharge.          Ryan Ricardo, MSW

## 2021-04-07 NOTE — PROGRESS NOTES
Hospitalist Progress Note         [place] STEVEN Velez, FNP-C    Daily Progress Note: 4/7/2021      Subjective:   Subjective   Seen on follow-up alert and oriented lying in bed  Patient reports shortness of breath with ambulation  No acute distress noted on examination    Review of Systems:   Review of Systems   Constitutional: Negative. HENT: Negative. Eyes: Negative. Respiratory: Positive for cough and shortness of breath. Cardiovascular: Negative. Gastrointestinal: Negative. Genitourinary: Negative. Musculoskeletal: Negative. Skin: Negative. Neurological: Negative. Endo/Heme/Allergies: Negative. Psychiatric/Behavioral: Negative. Objective:   Objective      Vitals:  Patient Vitals for the past 12 hrs:   Temp Pulse Resp BP SpO2   04/07/21 1137     92 %   04/07/21 0750 98 °F (36.7 °C) 86 18 114/70 90 %   04/07/21 0652     90 %        Physical Exam:  Physical Exam  Vitals signs and nursing note reviewed. HENT:      Head: Normocephalic. Nose: Nose normal.      Mouth/Throat:      Mouth: Mucous membranes are moist.   Eyes:      Extraocular Movements: Extraocular movements intact. Neck:      Musculoskeletal: Normal range of motion. Cardiovascular:      Rate and Rhythm: Normal rate and regular rhythm. Pulses: Normal pulses. Heart sounds: Normal heart sounds. Pulmonary:      Effort: Pulmonary effort is normal.      Comments: 2L NC, congested cough  Abdominal:      General: Bowel sounds are normal.   Musculoskeletal: Normal range of motion. Skin:     General: Skin is warm and dry. Capillary Refill: Capillary refill takes less than 2 seconds. Neurological:      Mental Status: She is alert and oriented to person, place, and time. Psychiatric:         Mood and Affect: Mood normal.         Behavior: Behavior normal.          Lab Results:  No results found for this or any previous visit (from the past 24 hour(s)).        Diagnostic Images:  CT Results  (Last 48 hours)    None          Current Medications:    Current Facility-Administered Medications:     azithromycin (ZITHROMAX) tablet 500 mg, 500 mg, Oral, DAILY, Lara Jane NP    predniSONE (DELTASONE) tablet 40 mg, 40 mg, Oral, DAILY WITH BREAKFAST, Jimmy Russ NP    albuterol (PROVENTIL HFA, VENTOLIN HFA, PROAIR HFA) inhaler 2 Puff, 2 Puff, Inhalation, Q4H PRN, Anastasia Merrill MD    sodium chloride (NS) flush 5-40 mL, 5-40 mL, IntraVENous, Q8H, Gigi RUIZ MD, 10 mL at 04/07/21 0505    sodium chloride (NS) flush 5-40 mL, 5-40 mL, IntraVENous, PRN, Anastasia Merrill MD    acetaminophen (TYLENOL) tablet 650 mg, 650 mg, Oral, Q6H PRN, 650 mg at 04/06/21 2044 **OR** acetaminophen (TYLENOL) suppository 650 mg, 650 mg, Rectal, Q6H PRN, Anastasia Merrill MD    polyethylene glycol (MIRALAX) packet 17 g, 17 g, Oral, DAILY PRN, Anastasia Merrill MD    promethazine (PHENERGAN) tablet 12.5 mg, 12.5 mg, Oral, Q6H PRN **OR** ondansetron (ZOFRAN) injection 4 mg, 4 mg, IntraVENous, Q6H PRN, Anastasia Merrill MD    enoxaparin (LOVENOX) injection 40 mg, 40 mg, SubCUTAneous, DAILY, Gigi RUIZ MD, 40 mg at 04/05/21 0856    remdesivir 100 mg in 0.9% sodium chloride 250 mL IVPB, 100 mg, IntraVENous, Q24H, Gigi RUIZ MD, 100 mg at 04/07/21 0859    guaiFENesin ER (MUCINEX) tablet 600 mg, 600 mg, Oral, Q12H, Karri Armendariz NP, 600 mg at 04/07/21 0859    dextromethorphan (DELSYM) 30 mg/5 mL syrup 30 mg, 30 mg, Oral, Q12H, Karri Armendariz NP, 30 mg at 04/07/21 0859       ASSESSMENT:    1. Covid Pneumonia: Continue azithromycin, Decadron 6 mg every 8 hours, Tessalon, Mucinex, remdesivir. Currently on supplemental O2, wean as tolerated. As needed inhalers. 4/7 patient has lost IV access again after multiple sticks, she refuses to have IV access reattempted. Will discontinue remdesivir, transition azithromycin to oral, discontinue Decadron start oral prednisone.  Walk study shows patient 90% on room air, drops to 86% while ambulating on room air, ambulation with 2 L nasal cannula increases to 92%. 2. Covid 19: Continue above treatment plan  3. Hypokalemia: Low normal, replenished with oral potassium 40meq  4. Transaminase: Ultrasound renal negative        Full Code  Dvt Prophylaxis  GI Prophylaxis  Home medications reviewed and reconciled      Above diagnostic results and treatment plan reviewed and discussed with patient in detail at bedside, all questions answered. Anticipate discharge within 24 hours pending patient remains clinically stable. Care Plan discussed with: Interdisciplinary team    Total time spent with patient: 25 minutes.     Deloris Kwon NP

## 2021-04-08 VITALS
OXYGEN SATURATION: 91 % | HEART RATE: 70 BPM | HEIGHT: 62 IN | DIASTOLIC BLOOD PRESSURE: 63 MMHG | SYSTOLIC BLOOD PRESSURE: 101 MMHG | TEMPERATURE: 98 F | WEIGHT: 189.8 LBS | RESPIRATION RATE: 15 BRPM | BODY MASS INDEX: 34.93 KG/M2

## 2021-04-08 PROCEDURE — 74011250637 HC RX REV CODE- 250/637: Performed by: NURSE PRACTITIONER

## 2021-04-08 PROCEDURE — 77010033678 HC OXYGEN DAILY

## 2021-04-08 PROCEDURE — 94760 N-INVAS EAR/PLS OXIMETRY 1: CPT

## 2021-04-08 PROCEDURE — 74011636637 HC RX REV CODE- 636/637: Performed by: NURSE PRACTITIONER

## 2021-04-08 RX ORDER — PREDNISONE 20 MG/1
40 TABLET ORAL
Qty: 6 TAB | Refills: 0 | Status: SHIPPED | OUTPATIENT
Start: 2021-04-09 | End: 2021-04-12

## 2021-04-08 RX ADMIN — GUAIFENESIN 600 MG: 600 TABLET, EXTENDED RELEASE ORAL at 09:33

## 2021-04-08 RX ADMIN — DEXTROMETHORPHAN 30 MG: 30 SUSPENSION, EXTENDED RELEASE ORAL at 09:33

## 2021-04-08 RX ADMIN — AZITHROMYCIN MONOHYDRATE 500 MG: 500 TABLET ORAL at 09:33

## 2021-04-08 RX ADMIN — PREDNISONE 40 MG: 20 TABLET ORAL at 09:33

## 2021-04-08 NOTE — DISCHARGE INSTRUCTIONS
Patient Education        Learning About the COVID-19 Vaccine  Overview     The COVID-19 vaccine can help you avoid getting COVID-19, a disease caused by a new type of coronavirus. COVID-19 can cause pneumonia and even death. You may need two doses of the vaccine. And you might need \"booster\" doses later on to help you stay protected. The vaccine prevents most cases of COVID-19. But if you do still catch COVID-19, your symptoms will probably be less severe than if you hadn't gotten the vaccine. You can't get COVID-19 from the vaccine. The risk of serious problems from the vaccine is very low. And you might not have any side effects from the vaccine at all. If you do, they will probably be a lot like the common side effects of other vaccines. They include things like a slight fever, muscle aches, and soreness. These side effects don't last too long, and they can be treated if they bother you. Why is it a good idea to get the COVID-19 vaccine? The COVID-19 vaccine is one of the best ways to help stop the pandemic. Getting vaccinated as soon as you can will help protect you from the virus. It will also help you protect people around you from the virus--people who could really be hurt. The COVID-19 vaccine is safe and effective. In fact, the risk of serious problems from COVID-19 is much higher than the risk of serious problems from the vaccine. So it's safer to get the vaccine than it is to catch COVID-19. Who should get the COVID-19 vaccine? Everyone who is able to get the vaccine should get it as soon as possible. The more people who get vaccinated, the better we'll be able to stop the spread of the virus. The vaccine is extra important for people who are at high risk. This includes people who may be exposed to COVID-19 more often because of their jobs. It also includes people who are at high risk for complications from [id number] if they catch it.  Some examples of people at high risk include those who:  · Work in health care. · Are considered essential workers. · Have certain health conditions. · Are older than age 72. If you've already had COVID-19, you may still be able to catch it again. Getting the vaccine may provide extra protection. Where can you learn more? Go to http://www.gray.com/  Enter C124 in the search box to learn more about \"Learning About the COVID-19 Vaccine. \"  Current as of: December 18, 2020               Content Version: 12.8  © 2006-2021 [employer]. Care instructions adapted under license by [employer] (which disclaims liability or warranty for this information). If you have questions about a medical condition or this instruction, always ask your healthcare professional. [address] any warranty or liability for your use of this information. Patient Education        Learning About COVID-19 and Flu Symptoms  How can you tell COVID-19 from the flu? COVID-19 and the flu have similar symptoms. The two can be hard to tell apart. The only way to know for sure which illness you have is to be tested. Since the symptoms are so alike, it makes sense to act as if you have COVID-19 until your test results come back. This means staying home and limiting contact with people in your home. You'll need to wash your hands often and disinfect surfaces that you touch. And be sure to wear a mask or face covering when you're around other people. This is also good advice if you think you have the flu. COVID-19 and the flu have these symptoms in common:  · Fever or chills  · Cough  · Shortness of breath  · Fatigue (tiredness)  · Sore throat  · Runny or stuffy nose  · Muscle pain or body aches  · Headache  · Vomiting and diarrhea (more common in children than adults)  COVID-19 has another symptom that also may occur:  · New loss of taste or smell  COVID-19 symptoms may appear from 2 to 14 days after infection.   Flu symptoms usually appear 1 to 4 days after infection. Why should you get a flu shot during the COVID-19 pandemic? It's important to get your yearly flu vaccine. Both the flu and COVID-19 are expected to be active during flu season. You can get sick with both infections at once. And having both may make you more sick than getting just one. The flu vaccine won't protect you from COVID-19. But it can help prevent the flu or reduce its symptoms. If fewer people get very ill with the flu, this will help free up medical resources that are needed for COVID-19 patients. Where can you learn more? Go to http://www.Pathful.com/  Enter C123 in the search box to learn more about \"Learning About COVID-19 and Flu Symptoms. \"  Current as of: December 18, 2020               Content Version: 12.8  © 0977-9297 [employer]. Care instructions adapted under license by [employer] (which disclaims liability or warranty for this information). If you have questions about a medical condition or this instruction, always ask your healthcare professional. [address] any warranty or liability for your use of this information.

## 2021-04-08 NOTE — PROGRESS NOTES
Pt's home oxygen arrived from [facility] [address] patient on use of machine. Gave discharge paperwork to patient and she verbalized understanding of the instructions, follow ups and medication orders. Pt wheeled down to the front door by this nurse to be picked up by a friend. Pt in no distress.

## 2021-04-08 NOTE — PROGRESS NOTES
CM was notified patient will need home oxygen and home health arranged. CM spoke with the patient and she acknowledged her understanding of both HH and oxygen. Order obtained and referrals have been sent. Patient cannot be discharged until her home oxygen is delivered to the bedside. Addendum: Patient's oxygen has been approved through [facility] and will be delivered to the bedside. [facility] and discharge orders have been sent via [facility]. Discharge plan of care/case management plan validated with provider discharge order.

## 2021-04-08 NOTE — DISCHARGE SUMMARY
Admit date: 4/4/2021   Admitting Provider: George Huitron MD    Discharge date: 4/8/2021  Discharging Provider: Johan Benz NP      * Admission Diagnoses: Hypoxia [R09.02]  Pneumonia due to COVID-19 virus [U07.1, J12.82]  COVID-19 [U07.1]    * Discharge Diagnoses:    Hospital Problems as of 4/8/2021 Never Reviewed          Codes Class Noted - Resolved POA    COVID-19 ICD-10-CM: U07.1  ICD-9-CM: 079.89  4/5/2021 - Present Unknown        Hypoxia ICD-10-CM: R09.02  ICD-9-CM: 799.02  4/4/2021 - Present Unknown        Pneumonia due to COVID-19 virus ICD-10-CM: U07.1, J12.82  ICD-9-CM: 480.8, 079.89  4/4/2021 - Present Unknown            HPI: Koffi Joseph is a 46 y.o. female with no previous diagnosed medical problems presenting to the ER with complaints of decreased appetite, nausea, vomiting, diarrhea, fever, chills, shortness of breath, cough, fatigue, weakness and myalgias. Symptoms have been present since this past Monday. Ms. Pedro Luis Smith was tested for COVID-19 and found to be positive this past Monday. Over the past 24 hours, shortness of breath and cough have worsened. Patient presents to the ER for further treatment and evaluation.     On arrival to the ER, temperature is 99.5 °F, pulse 111, respirations 26, blood pressure 93/59 and oxygen saturation 91% on room air. CT of the chest reports extensive patchy pneumonitis and pneumonia throughout each lung. Total bilirubin and liver enzymes were elevated. Potassium was decreased with a value of 3.4. Blood cultures were ordered obtained prior to administering 1 dose of IV azithromycin and ceftriaxone. IV Decadron was administered in the ER. Hospital service has been asked to admit for further treatment and evaluation.     Ms. Pedro Luis Smith denies previous diagnosed medical problems. She has never had any surgery. Family history and social history were reviewed at the time of admission. Ms. Pedro Luis Smith takes oral contraceptive pills.   Patient denies tobacco, alcohol or illicit drug use. Ms. Fernando Brenner is a full code. [facility] Course: Covid pneumonia noted on chest x-ray. Patient treated with IV azithromycin, Decadron, Tessalon, Mucinex, remdesivir. Currently maintaining O2 sats at 94% on 2 L nasal cannula. Patient performed walk study which shows O2 requirement at home. Case management consulted for home health services in O2 management. Hypokalemia replenished with oral potassium. Transaminases noted on routine lab work, ultrasound renal negative. Patient medically stable for discharge once O2 supplementation and home health has been arranged. * Procedures:   * No surgery found *      Consults: Case management    Significant Diagnostic Studies:    XR Results (maximum last 3): Results from [place] encounter on 04/04/21   XR CHEST PORT     Narrative 1 view     Mild patchy mixed infiltrates in the lower portions of each lung. No effusion. Normal heart and mediastinum        Discharge Exam:  Vitals signs and nursing note reviewed. HENT:      Head: Normocephalic. Nose: Nose normal.      Mouth/Throat:      Mouth: Mucous membranes are moist.   Eyes:      Extraocular Movements: Extraocular movements intact. Neck:      Musculoskeletal: Normal range of motion. Cardiovascular:      Rate and Rhythm: Normal rate and regular rhythm. Pulses: Normal pulses. Heart sounds: Normal heart sounds. Pulmonary:      Effort: Pulmonary effort is normal.      Comments: 2L NC, congested cough  Abdominal:      General: Bowel sounds are normal.   Musculoskeletal: Normal range of motion. Skin:     General: Skin is warm and dry. Capillary Refill: Capillary refill takes less than 2 seconds. Neurological:      Mental Status: She is alert and oriented to person, place, and time.    Psychiatric:         Mood and Affect: Mood normal.         Behavior: Behavior normal.      Patient Vitals for the past 12 hrs:   Temp Pulse Resp BP SpO2   04/08/21 0805 98 °F (36.7 °C) 70 15 101/63 100 %   04/08/21 0120 98.1 °F (36.7 °C) 67 18 110/65        * Discharge Condition: stable  * Disposition: Home with home health    Discharge Medications:  Current Discharge Medication List      START taking these medications    Details   predniSONE (DELTASONE) 20 mg tablet Take 40 mg by mouth daily (with breakfast) for 3 days. Qty: 6 Tab, Refills: 0      guaiFENesin ER (MUCINEX) 600 mg ER tablet Take 1 Tab by mouth every twelve (12) hours for 7 days. Indications: cough  Qty: 14 Tab, Refills: 0      albuterol (PROVENTIL HFA, VENTOLIN HFA, PROAIR HFA) 90 mcg/actuation inhaler Take 2 Puffs by inhalation every four (4) hours as needed for Wheezing, Shortness of Breath or Cough for up to 30 days. Qty: 1 Inhaler, Refills: 0      azithromycin (ZITHROMAX) 500 mg tab Take 1 Tab by mouth daily for 3 days. Qty: 3 Tab, Refills: 0         CONTINUE these medications which have NOT CHANGED    Details   ethynodiol-ethinyl estradiol (DEMULEN) 1-35 mg-mcg tab Take 1 Tab by mouth daily. Indications: birth control             * Follow-up Care/Patient Instructions: Activity: Activity as tolerated  Diet: Regular Diet  Wound Care: None needed    Patient to continue all medications as prescribed  Patient counseled on importance of follow-up appointments  Patient educated that she is to self quarantine x14 days from initial positive results  Patient educated that she will be discharged home with home health and supplemental O2  Patient medically stable for discharge  Supplemental O2 pending delivery  Patient educated on medications that have been prescribed at discharge.     Follow-up Information     Follow up With Specialties Details Why Contact Info    Sonia Mckeon NP Nurse Practitioner In 1 week  [address] [address]  [phone]      [address] [facility]  As needed, If symptoms worsen [address] [facility]        TIME SPENT: 27 minutes  CC: Silvestre Pedersen NP    Signed:  Prachi Cain NP  4/8/2021  9:50 AM

## 2021-04-08 NOTE — PROGRESS NOTES
Problem: Falls - Risk of  Goal: *Absence of Falls  Description: Document Madelin Bee Fall Risk and appropriate interventions in the flowsheet.   Outcome: Progressing Towards Goal  Note: Fall Risk Interventions:                                Problem: Patient Education: Go to Patient Education Activity  Goal: Patient/Family Education  Outcome: Progressing Towards Goal

## 2021-04-12 LAB
BACTERIA SPEC CULT: NORMAL
SPECIAL REQUESTS,SREQ: NORMAL

## 2022-03-18 PROBLEM — U07.1 COVID-19: Status: ACTIVE | Noted: 2021-04-05

## 2022-03-19 PROBLEM — U07.1 PNEUMONIA DUE TO COVID-19 VIRUS: Status: ACTIVE | Noted: 2021-04-04

## 2022-03-19 PROBLEM — J12.82 PNEUMONIA DUE TO COVID-19 VIRUS: Status: ACTIVE | Noted: 2021-04-04

## 2022-03-20 PROBLEM — R09.02 HYPOXIA: Status: ACTIVE | Noted: 2021-04-04

## 2023-05-12 RX ORDER — ETHYNODIOL DIACETATE AND ETHINYL ESTRADIOL 1 MG-35MCG
1 KIT ORAL DAILY
COMMUNITY